# Patient Record
Sex: MALE | Race: WHITE | Employment: FULL TIME | URBAN - METROPOLITAN AREA
[De-identification: names, ages, dates, MRNs, and addresses within clinical notes are randomized per-mention and may not be internally consistent; named-entity substitution may affect disease eponyms.]

---

## 2017-03-31 ENCOUNTER — TELEPHONE (OUTPATIENT)
Dept: FAMILY MEDICINE | Facility: CLINIC | Age: 22
End: 2017-03-31

## 2017-03-31 NOTE — TELEPHONE ENCOUNTER
"Keith asked me to call patient for a \"check up\" call. Patient has not seen keith since 2015 and wants to know if he would like to make an appointment with her to follow up and check in. If patient calls back just give him the option.     "

## 2017-04-10 NOTE — TELEPHONE ENCOUNTER
A phone visit would be good.  Then we could catch up and update things.     Thanks.  GOMEZ Aguilar

## 2017-04-10 NOTE — TELEPHONE ENCOUNTER
I did talk to the patient and he states he is doing great.  Patient is in a half way house at this time in [place].  Patient reports he works everyday and it would be really hard to schedule an office visit.  Patient states he would like to talk to you.  I did put him in your schedule for 4/11/17 at 2pm.  For a phone visit.  Would you like to keep this appt for him?    Thank you  Allyson MOORE RN

## 2017-04-11 ENCOUNTER — VIRTUAL VISIT (OUTPATIENT)
Dept: FAMILY MEDICINE | Facility: CLINIC | Age: 22
End: 2017-04-11

## 2017-04-11 DIAGNOSIS — Z53.9 ERRONEOUS ENCOUNTER--DISREGARD: Primary | ICD-10-CM

## 2017-04-11 NOTE — MR AVS SNAPSHOT
"              After Visit Summary   2017    Obed De La Cruz    MRN: [id number]           Patient Information     Date Of Birth          1995        Visit Information        Provider Department      2017 2:00 PM Maddi Willett NP [facility]        Today's Diagnoses     ERRONEOUS ENCOUNTER--DISREGARD    -  1       Follow-ups after your visit        Who to contact     If you have questions or need follow up information about today's clinic visit or your schedule please contact [facility] directly at [phone].  Normal or non-critical lab and imaging results will be communicated to you by Feedtracehart, letter or phone within 4 business days after the clinic has received the results. If you do not hear from us within 7 days, please contact the clinic through Feedtracehart or phone. If you have a critical or abnormal lab result, we will notify you by phone as soon as possible.  Submit refill requests through AntFarm or call your pharmacy and they will forward the refill request to us. Please allow 3 business days for your refill to be completed.          Additional Information About Your Visit        MyChart Information     AntFarm lets you send messages to your doctor, view your test results, renew your prescriptions, schedule appointments and more. To sign up, go to [email or web address] . Click on \"Log in\" on the left side of the screen, which will take you to the Welcome page. Then click on \"Sign up Now\" on the right side of the page.     You will be asked to enter the access code listed below, as well as some personal information. Please follow the directions to create your username and password.     Your access code is: [email or web address]  Expires: 2017 12:45 PM     Your access code will  in 90 days. If you need help or a new code, please call your [facility] or [phone].        Care EveryWhere ID     This is your Care EveryWhere ID. This could be used by " other organizations to access your [facility] medical records  [id number]         Blood Pressure from Last 3 Encounters:   01/27/16 120/70   12/09/15 126/75   06/24/15 138/81    Weight from Last 3 Encounters:   01/27/16 172 lb (78 kg)   12/09/15 177 lb 6.4 oz (80.5 kg)   04/01/15 174 lb 6.4 oz (79.1 kg)              Today, you had the following     No orders found for display       Primary Care Provider Office Phone # Fax #    Maddi Fay Willett [phone]       [facility] [address] [address]        Thank you!     Thank you for choosing [facility]  for your care. Our goal is always to provide you with excellent care. Hearing back from our patients is one way we can continue to improve our services. Please take a few minutes to complete the written survey that you may receive in the mail after your visit with us. Thank you!             Your Updated Medication List - Protect others around you: Learn how to safely use, store and throw away your medicines at www.disposemymeds.org.          This list is accurate as of: 4/11/17 11:59 PM.  Always use your most recent med list.                   Brand Name Dispense Instructions for use    ascorbic acid 500 MG tablet    VITAMIN C     Take 500 mg by mouth daily       lisdexamfetamine 40 MG capsule    VYVANSE    30 capsule    Take 1 capsule (40 mg) by mouth every morning       multivitamin, therapeutic Tabs tablet      Take 1 tablet by mouth daily       VITAMIN D3 PO      Take 2,000 Units by mouth daily

## 2017-04-11 NOTE — PROGRESS NOTES
Called patient and he did not have time to do a phone visit. He will call back when he has more time.   Jessica Devries CMA ([facility])

## 2017-10-10 ENCOUNTER — OFFICE VISIT (OUTPATIENT)
Dept: FAMILY MEDICINE | Facility: CLINIC | Age: 22
End: 2017-10-10
Payer: COMMERCIAL

## 2017-10-10 VITALS
WEIGHT: 165 LBS | SYSTOLIC BLOOD PRESSURE: 126 MMHG | BODY MASS INDEX: 22.35 KG/M2 | TEMPERATURE: 99.2 F | DIASTOLIC BLOOD PRESSURE: 70 MMHG | OXYGEN SATURATION: 96 % | HEIGHT: 72 IN | HEART RATE: 80 BPM

## 2017-10-10 DIAGNOSIS — F11.20: ICD-10-CM

## 2017-10-10 DIAGNOSIS — J20.9 ACUTE BRONCHITIS WITH SYMPTOMS > 10 DAYS: ICD-10-CM

## 2017-10-10 DIAGNOSIS — R61 GENERALIZED HYPERHIDROSIS: Primary | ICD-10-CM

## 2017-10-10 DIAGNOSIS — F11.10 OPIATE ABUSE, EPISODIC (H): ICD-10-CM

## 2017-10-10 PROCEDURE — 99214 OFFICE O/P EST MOD 30 MIN: CPT | Performed by: NURSE PRACTITIONER

## 2017-10-10 RX ORDER — AZITHROMYCIN 250 MG/1
TABLET, FILM COATED ORAL
Qty: 6 TABLET | Refills: 0 | Status: SHIPPED | OUTPATIENT
Start: 2017-10-10 | End: 2018-06-07

## 2017-10-10 NOTE — PROGRESS NOTES
SUBJECTIVE:   Obed De La Cruz is a 22 year old male who presents to clinic today for the following health issues:    ENT Symptoms             Symptoms: cc Present Absent Comment   Fever/Chills  x     Fatigue  x     Muscle Aches   x    Eye Irritation   x    Sneezing   x    Nasal Dev/Drg  x     Sinus Pressure/Pain   x    Loss of smell   x    Dental pain   x    Sore Throat   x    Swollen Glands   x    Ear Pain/Fullness  x     Cough  x  Dry cough.    Wheeze  x     Chest Pain   x    Shortness of breath  x     Rash   x    Other   x      Symptom duration:  2 weeks    Symptom severity:  moderate    Treatments tried:  None    Contacts:  None      Reports some chills and sweating.  Persistent cough - non productive.    Also reports ongoing axillary sweating - excessive.  Has tried multiple over the counter options without much improvement.    Problem list and histories reviewed & adjusted, as indicated.  Additional history: as documented    Patient Active Problem List   Diagnosis     Attention deficit hyperactivity disorder (ADHD)     Anxiety     Substance abuse     Heroin use disorder, severe, in controlled environment, dependence (H)     Opiate abuse, episodic     History reviewed. No pertinent surgical history.    Social History   Substance Use Topics     Smoking status: Former Smoker     Smokeless tobacco: Former User     Alcohol use Yes     Family History   Problem Relation Age of Onset     Hypertension Father          Current Outpatient Prescriptions   Medication Sig Dispense Refill     aluminum chloride (DRYSOL) 20 % external solution Apply topically At Bedtime To improve effect, cover area of application with plastic wrap,  hold in place with tight shirt, and wash area in morning. As sweating improves, decrease use to 1-2 times weekly. 60 mL 3     azithromycin (ZITHROMAX) 250 MG tablet Two tablets first day, then one tablet daily for four days. 6 tablet 0     multivitamin, therapeutic (THERA-VIT) TABS Take 1 tablet  by mouth daily       ascorbic acid (VITAMIN C) 500 MG tablet Take 500 mg by mouth daily       Cholecalciferol (VITAMIN D3 PO) Take 2,000 Units by mouth daily       lisdexamfetamine (VYVANSE) 40 MG capsule Take 1 capsule (40 mg) by mouth every morning 30 capsule 0     Allergies   Allergen Reactions     Sulfa Drugs Rash     Recent Labs   Lab Test  08/02/12   2150  04/09/12   1930   CR  1.16*  0.90   GFRESTIMATED  81  >90   GFRESTBLACK  >90  >90   POTASSIUM  3.7  4.1   TSH   --   0.86      BP Readings from Last 3 Encounters:   10/10/17 126/70   01/27/16 120/70   12/09/15 126/75    Wt Readings from Last 3 Encounters:   10/10/17 165 lb (74.8 kg)   01/27/16 172 lb (78 kg)   12/09/15 177 lb 6.4 oz (80.5 kg)                  Labs reviewed in EPIC          Reviewed and updated as needed this visit by clinical staff     Reviewed and updated as needed this visit by Provider         ROS:  Constitutional, HEENT, cardiovascular, pulmonary, GI, , musculoskeletal, neuro, skin, endocrine and psych systems are negative, except as otherwise noted.      OBJECTIVE:   /70  Pulse 80  Temp 99.2  F (37.3  C) (Tympanic)  Ht 6' (1.829 m)  Wt 165 lb (74.8 kg)  SpO2 96%  BMI 22.38 kg/m2  Body mass index is 22.38 kg/(m^2).  GENERAL: healthy, alert and no distress  HENT: ear canals and TM's normal, nose and mouth without ulcers or lesions  NECK: no adenopathy, no asymmetry, masses, or scars and thyroid normal to palpation  RESP: lungs clear to auscultation - no rales, rhonchi or wheezes  CV: regular rate and rhythm, normal S1 S2, no S3 or S4, no murmur, click or rub, no peripheral edema and peripheral pulses strong  ABDOMEN: soft, nontender, no hepatosplenomegaly, no masses and bowel sounds normal  MS: no gross musculoskeletal defects noted, no edema  Skin: no lesions or rashes.    Diagnostic Test Results:  none     ASSESSMENT/PLAN:     1. Generalized hyperhidrosis   The risks, benefits and treatment options of prescribed  medications or other treatments have been discussed with the patient. The patient verbalized their understanding and should call or follow up if no improvement or if they develop further problems.        - aluminum chloride (DRYSOL) 20 % external solution; Apply topically At Bedtime To improve effect, cover area of application with plastic wrap,  hold in place with tight shirt, and wash area in morning. As sweating improves, decrease use to 1-2 times weekly.  Dispense: 60 mL; Refill: 3    2. Acute bronchitis with symptoms > 10 days     - azithromycin (ZITHROMAX) 250 MG tablet; Two tablets first day, then one tablet daily for four days.  Dispense: 6 tablet; Refill: 0    3. Heroin use disorder, severe, in controlled environment, dependence (H)   Currently sober X 9 months.  Enrolled in school.  Attending AA 2 X per week.  Doing very well.    4. Opiate abuse, episodic   Sober.  Discussed maintaining sobriety  Discussed support groups and resources.      There are no Patient Instructions on file for this visit.    Maddi Willett NP  [facility]    Total times spent with patient 30 minutes of which > 50% of the time was spent counseling and coordination of care discussion of above, sobriety, plan and follow up

## 2017-10-10 NOTE — MR AVS SNAPSHOT
"              After Visit Summary   10/10/2017    Obed De La Cruz    MRN: [id number]           Patient Information     Date Of Birth          1995        Visit Information        Provider Department      10/10/2017 12:40 PM Maddi Willett NP [facility]        Today's Diagnoses     Generalized hyperhidrosis    -  1    Acute bronchitis with symptoms > 10 days        Heroin use disorder, severe, in controlled environment, dependence (H)        Opiate abuse, episodic           Follow-ups after your visit        Who to contact     If you have questions or need follow up information about today's clinic visit or your schedule please contact [facility] directly at [phone].  Normal or non-critical lab and imaging results will be communicated to you by Stream Tagshart, letter or phone within 4 business days after the clinic has received the results. If you do not hear from us within 7 days, please contact the clinic through Stream Tagshart or phone. If you have a critical or abnormal lab result, we will notify you by phone as soon as possible.  Submit refill requests through TechLoaner or call your pharmacy and they will forward the refill request to us. Please allow 3 business days for your refill to be completed.          Additional Information About Your Visit        MyChart Information     TechLoaner lets you send messages to your doctor, view your test results, renew your prescriptions, schedule appointments and more. To sign up, go to [email or web address] . Click on \"Log in\" on the left side of the screen, which will take you to the Welcome page. Then click on \"Sign up Now\" on the right side of the page.     You will be asked to enter the access code listed below, as well as some personal information. Please follow the directions to create your username and password.     Your access code is: [email or web address]  Expires: 2018  3:17 PM     Your access code will  in 90 days. If you need help " or a new code, please call your [facility] clinic or [phone].        Care EveryWhere ID     This is your Care EveryWhere ID. This could be used by other organizations to access your [facility] medical records  [id number]        Your Vitals Were     Pulse Temperature Height Pulse Oximetry BMI (Body Mass Index)       80 99.2  F (37.3  C) (Tympanic) 6' (1.829 m) 96% 22.38 kg/m2        Blood Pressure from Last 3 Encounters:   10/10/17 126/70   01/27/16 120/70   12/09/15 126/75    Weight from Last 3 Encounters:   10/10/17 165 lb (74.8 kg)   01/27/16 172 lb (78 kg)   12/09/15 177 lb 6.4 oz (80.5 kg)              Today, you had the following     No orders found for display         Today's Medication Changes          These changes are accurate as of: 10/10/17  3:17 PM.  If you have any questions, ask your nurse or doctor.               Start taking these medicines.        Dose/Directions    aluminum chloride 20 % external solution   Commonly known as:  DRYSOL   Used for:  Generalized hyperhidrosis   Started by:  Maddi Willett, NP        Apply topically At Bedtime To improve effect, cover area of application with plastic wrap,  hold in place with tight shirt, and wash area in morning. As sweating improves, decrease use to 1-2 times weekly.   Quantity:  60 mL   Refills:  3       azithromycin 250 MG tablet   Commonly known as:  ZITHROMAX   Used for:  Acute bronchitis with symptoms > 10 days   Started by:  Maddi Willett NP        Two tablets first day, then one tablet daily for four days.   Quantity:  6 tablet   Refills:  0            Where to get your medicines      These medications were sent to [facility] [place], [place] - [address]  [address] [address]     Phone:  [phone]     azithromycin 250 MG tablet         Some of these will need a paper prescription and others can be bought over the counter.  Ask your nurse if you have questions.     Bring a paper  prescription for each of these medications     aluminum chloride 20 % external solution                Primary Care Provider Office Phone # Fax #    Maddi Willett, [place] [phone] [address] [address]        Equal Access to Services     GÓMEZ COFFEY : Hadii aad ku hadchriso Soomaali, waaxda luqadaha, qaybta kaalmada adeegyada, ernesto gracia haycesarn marie bethaniekellen araujo. So [facility] [phone].    ATENCIÓN: Si habla español, tiene a espino disposición servicios gratuitos de asistencia lingüística. Llame al [phone].    We comply with applicable federal civil rights laws and [place] laws. We do not discriminate on the basis of race, color, national origin, age, disability, sex, sexual orientation, or gender identity.            Thank you!     Thank you for choosing [facility]  for your care. Our goal is always to provide you with excellent care. Hearing back from our patients is one way we can continue to improve our services. Please take a few minutes to complete the written survey that you may receive in the mail after your visit with us. Thank you!             Your Updated Medication List - Protect others around you: Learn how to safely use, store and throw away your medicines at www.disposemymeds.org.          This list is accurate as of: 10/10/17  3:17 PM.  Always use your most recent med list.                   Brand Name Dispense Instructions for use Diagnosis    aluminum chloride 20 % external solution    DRYSOL    60 mL    Apply topically At Bedtime To improve effect, cover area of application with plastic wrap,  hold in place with tight shirt, and wash area in morning. As sweating improves, decrease use to 1-2 times weekly.    Generalized hyperhidrosis       ascorbic acid 500 MG tablet    VITAMIN C     Take 500 mg by mouth daily        azithromycin 250 MG tablet    ZITHROMAX    6 tablet    Two tablets first day, then one tablet daily for four days.     Acute bronchitis with symptoms > 10 days       lisdexamfetamine 40 MG capsule    VYVANSE    30 capsule    Take 1 capsule (40 mg) by mouth every morning    Attention deficit disorder with hyperactivity(314.01)       multivitamin, therapeutic Tabs tablet      Take 1 tablet by mouth daily        VITAMIN D3 PO      Take 2,000 Units by mouth daily

## 2017-10-10 NOTE — NURSING NOTE
Initial /70  Pulse 80  Temp 99.2  F (37.3  C) (Tympanic)  Ht 6' (1.829 m)  Wt 165 lb (74.8 kg)  SpO2 96%  BMI 22.38 kg/m2 Estimated body mass index is 22.38 kg/(m^2) as calculated from the following:    Height as of this encounter: 6' (1.829 m).    Weight as of this encounter: 165 lb (74.8 kg). .    Jessica Devries CMA ([facility])

## 2018-06-07 ENCOUNTER — OFFICE VISIT (OUTPATIENT)
Dept: FAMILY MEDICINE | Facility: CLINIC | Age: 23
End: 2018-06-07
Payer: COMMERCIAL

## 2018-06-07 VITALS
SYSTOLIC BLOOD PRESSURE: 136 MMHG | WEIGHT: 168 LBS | HEIGHT: 72 IN | DIASTOLIC BLOOD PRESSURE: 72 MMHG | BODY MASS INDEX: 22.75 KG/M2 | TEMPERATURE: 97.7 F | HEART RATE: 61 BPM

## 2018-06-07 DIAGNOSIS — F11.20: ICD-10-CM
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
## 2018-06-07 DIAGNOSIS — S40.861A TICK BITE OF RIGHT UPPER ARM, INITIAL ENCOUNTER: Primary | ICD-10-CM

## 2018-06-07 PROCEDURE — 99213 OFFICE O/P EST LOW 20 MIN: CPT | Performed by: NURSE PRACTITIONER

## 2018-06-07 RX ORDER — DOXYCYCLINE HYCLATE 100 MG
200 TABLET ORAL ONCE
Qty: 2 TABLET | Refills: 0 | Status: SHIPPED | OUTPATIENT
Start: 2018-06-07 | End: 2019-01-31

## 2018-06-07 NOTE — PROGRESS NOTES
SUBJECTIVE:   Obed De La Cruz is a 23 year old male who presents to clinic today for the following health issues:    Tick Bite.         Description (location/character/radiation): pt is concerned about a tick he found on his right side. He was at a park last Saturday and found the tick on Monday of this week. Pt states the tick was deep in his skin and it was difficult to get out. Pt denies and fevers or body aches.      No joint pain, rashes, fevers.    History of heroin abuse, substance abuse disorder.  Has been sober for 18 months now.  In college, on summer break living with parent.  Staying involved in programs and treatment.    Problem list and histories reviewed & adjusted, as indicated.  Additional history: as documented    Patient Active Problem List   Diagnosis     Attention deficit hyperactivity disorder (ADHD)     Anxiety     Substance abuse     Heroin use disorder, severe, in controlled environment, dependence (H)     Opiate abuse, episodic     History reviewed. No pertinent surgical history.    Social History   Substance Use Topics     Smoking status: Former Smoker     Smokeless tobacco: Former User     Alcohol use Yes     Family History   Problem Relation Age of Onset     Hypertension Father          Current Outpatient Prescriptions   Medication Sig Dispense Refill     aluminum chloride (DRYSOL) 20 % external solution Apply topically At Bedtime To improve effect, cover area of application with plastic wrap,  hold in place with tight shirt, and wash area in morning. As sweating improves, decrease use to 1-2 times weekly. (Patient not taking: Reported on 6/7/2018) 60 mL 3     ascorbic acid (VITAMIN C) 500 MG tablet Take 500 mg by mouth daily       Cholecalciferol (VITAMIN D3 PO) Take 2,000 Units by mouth daily       doxycycline (VIBRA-TABS) 100 MG tablet Take 2 tablets (200 mg) by mouth once for 1 dose 2 tablet 0     lisdexamfetamine (VYVANSE) 40 MG capsule Take 1 capsule (40 mg) by mouth every morning  (Patient not taking: Reported on 6/7/2018) 30 capsule 0     multivitamin, therapeutic (THERA-VIT) TABS Take 1 tablet by mouth daily       Allergies   Allergen Reactions     Sulfa Drugs Rash     Recent Labs   Lab Test  08/02/12   2150  04/09/12   1930   CR  1.16*  0.90   GFRESTIMATED  81  >90   GFRESTBLACK  >90  >90   POTASSIUM  3.7  4.1   TSH   --   0.86      BP Readings from Last 3 Encounters:   06/07/18 136/72   10/10/17 126/70   01/27/16 120/70    Wt Readings from Last 3 Encounters:   06/07/18 168 lb (76.2 kg)   10/10/17 165 lb (74.8 kg)   01/27/16 172 lb (78 kg)                  Labs reviewed in EPIC    Reviewed and updated as needed this visit by clinical staff       Reviewed and updated as needed this visit by Provider         ROS:  Constitutional, HEENT, cardiovascular, pulmonary, GI, , musculoskeletal, neuro, skin, endocrine and psych systems are negative, except as otherwise noted.    OBJECTIVE:     /72  Pulse 61  Temp 97.7  F (36.5  C) (Tympanic)  Ht 6' (1.829 m)  Wt 168 lb (76.2 kg)  BMI 22.78 kg/m2  Body mass index is 22.78 kg/(m^2).  GENERAL: healthy, alert and no distress  SKIN: no suspicious lesions or rashes and tick bite site in right upper arm, with small < 1 mm area of mild erythema otherwise healing.  PSYCH: mentation appears normal, affect normal/bright    Diagnostic Test Results:  none     ASSESSMENT/PLAN:     1. Tick bite of right upper arm, initial encounter  Discussed risks of transmission - thought was on > or = 72 hours.  The risks, benefits and treatment options of prescribed medications or other treatments have been discussed with the patient. The patient verbalized their understanding and should call or follow up if no improvement or if they develop further problems.    - doxycycline (VIBRA-TABS) 100 MG tablet; Take 2 tablets (200 mg) by mouth once for 1 dose  Dispense: 2 tablet; Refill: 0    2. Heroin use disorder, severe, in controlled environment, dependence (H)  Sober  for 18 months.  Encouraged continued sobriety.        Patient Instructions     Reassured regarding the likelyhood of transmission of Lyme disease in this setting.   Transmission of Lyme disease is unlikely if the tick is not clearly a larval form or a clearly defined deer tick, if the tick was not engorged or implanted less than 24 hours.  In studies where the tick was on for less than 24 hours the transmission was 0%.   If the tick was likely on more than 24 hrs, and it appears to be a deer tick, in areas of high prevalence of the Lyme bacteria, a single 200mg dose of doxycycline may be indicated to reduce the risk of Lyme disease, if given within 72 hrs of the tick being taken off.       GOMEZ Aguilar NP  [facility]

## 2018-06-07 NOTE — MR AVS SNAPSHOT
After Visit Summary   6/7/2018    Obed De La Cruz    MRN: [id number]           Patient Information     Date Of Birth          1995        Visit Information        Provider Department      6/7/2018 8:00 AM Maddi Willett NP [facility]        Today's Diagnoses     Tick bite of right upper arm, initial encounter    -  1      Care Instructions      Reassured regarding the likelyhood of transmission of Lyme disease in this setting.   Transmission of Lyme disease is unlikely if the tick is not clearly a larval form or a clearly defined deer tick, if the tick was not engorged or implanted less than 24 hours.  In studies where the tick was on for less than 24 hours the transmission was 0%.   If the tick was likely on more than 24 hrs, and it appears to be a deer tick, in areas of high prevalence of the Lyme bacteria, a single 200mg dose of doxycycline may be indicated to reduce the risk of Lyme disease, if given within 72 hrs of the tick being taken off.       GOMEZ Aguilar            Follow-ups after your visit        Who to contact     If you have questions or need follow up information about today's clinic visit or your schedule please contact [facility] directly at [phone].  Normal or non-critical lab and imaging results will be communicated to you by MyChart, letter or phone within 4 business days after the clinic has received the results. If you do not hear from us within 7 days, please contact the clinic through MyChart or phone. If you have a critical or abnormal lab result, we will notify you by phone as soon as possible.  Submit refill requests through [employer] or call your pharmacy and they will forward the refill request to us. Please allow 3 business days for your refill to be completed.          Additional Information About Your Visit        Care EveryWhere ID     This is your Care EveryWhere ID. This could be used by other organizations to access  your [facility] medical records  [id number]        Your Vitals Were     Pulse Temperature Height BMI (Body Mass Index)          61 97.7  F (36.5  C) (Tympanic) 6' (1.829 m) 22.78 kg/m2         Blood Pressure from Last 3 Encounters:   06/07/18 136/72   10/10/17 126/70   01/27/16 120/70    Weight from Last 3 Encounters:   06/07/18 168 lb (76.2 kg)   10/10/17 165 lb (74.8 kg)   01/27/16 172 lb (78 kg)              Today, you had the following     No orders found for display         Today's Medication Changes          These changes are accurate as of 6/7/18  8:44 AM.  If you have any questions, ask your nurse or doctor.               Start taking these medicines.        Dose/Directions    doxycycline 100 MG tablet   Commonly known as:  VIBRA-TABS   Used for:  Tick bite of right upper arm, initial encounter   Started by:  Maddi Willett NP        Dose:  200 mg   Take 2 tablets (200 mg) by mouth once for 1 dose   Quantity:  2 tablet   Refills:  0            Where to get your medicines      These medications were sent to [facility] [address]  [address] [facility] [address] [address]     Phone:  [phone]     doxycycline 100 MG tablet                Primary Care Provider Office Phone # Fax #    Maddi Willett [phone]       [address] [facility] [address]        Equal Access to Services     GÓMEZ COFFEY AH: Hadii janeth leeo Sosadi, waaxda luqadaha, qaybta kaalmada adeegyada, waxnelsy gracia maria adefrancesco araujo. So [facility] [phone].    ATENCIÓN: Si habla español, tiene a espino disposición servicios gratuitos de asistencia lingüística. Mikael alejo [phone].    We comply with applicable federal civil rights laws and [place] laws. We do not discriminate on the basis of race, color, national origin, age, disability, sex, sexual orientation, or gender identity.            Thank you!     Thank you for choosing [facility]  for  your care. Our goal is always to provide you with excellent care. Hearing back from our patients is one way we can continue to improve our services. Please take a few minutes to complete the written survey that you may receive in the mail after your visit with us. Thank you!             Your Updated Medication List - Protect others around you: Learn how to safely use, store and throw away your medicines at www.disposemymeds.org.          This list is accurate as of 6/7/18  8:44 AM.  Always use your most recent med list.                   Brand Name Dispense Instructions for use Diagnosis    aluminum chloride 20 % external solution    DRYSOL    60 mL    Apply topically At Bedtime To improve effect, cover area of application with plastic wrap,  hold in place with tight shirt, and wash area in morning. As sweating improves, decrease use to 1-2 times weekly.    Generalized hyperhidrosis       ascorbic acid 500 MG tablet    VITAMIN C     Take 500 mg by mouth daily        doxycycline 100 MG tablet    VIBRA-TABS    2 tablet    Take 2 tablets (200 mg) by mouth once for 1 dose    Tick bite of right upper arm, initial encounter       lisdexamfetamine 40 MG capsule    VYVANSE    30 capsule    Take 1 capsule (40 mg) by mouth every morning    Attention deficit disorder with hyperactivity(314.01)       multivitamin, therapeutic Tabs tablet      Take 1 tablet by mouth daily        VITAMIN D3 PO      Take 2,000 Units by mouth daily

## 2018-06-07 NOTE — NURSING NOTE
Initial /72  Pulse 61  Temp 97.7  F (36.5  C) (Tympanic)  Ht 6' (1.829 m)  Wt 168 lb (76.2 kg)  BMI 22.78 kg/m2 Estimated body mass index is 22.78 kg/(m^2) as calculated from the following:    Height as of this encounter: 6' (1.829 m).    Weight as of this encounter: 168 lb (76.2 kg). .    Jessica Devries CMA ([facility])

## 2018-06-07 NOTE — PATIENT INSTRUCTIONS
Reassured regarding the likelyhood of transmission of Lyme disease in this setting.   Transmission of Lyme disease is unlikely if the tick is not clearly a larval form or a clearly defined deer tick, if the tick was not engorged or implanted less than 24 hours.  In studies where the tick was on for less than 24 hours the transmission was 0%.   If the tick was likely on more than 24 hrs, and it appears to be a deer tick, in areas of high prevalence of the Lyme bacteria, a single 200mg dose of doxycycline may be indicated to reduce the risk of Lyme disease, if given within 72 hrs of the tick being taken off.       GOMEZ Aguilar

## 2019-01-31 ENCOUNTER — OFFICE VISIT (OUTPATIENT)
Dept: FAMILY MEDICINE | Facility: CLINIC | Age: 24
End: 2019-01-31
Payer: COMMERCIAL

## 2019-01-31 VITALS
DIASTOLIC BLOOD PRESSURE: 74 MMHG | BODY MASS INDEX: 23.24 KG/M2 | TEMPERATURE: 97.7 F | HEIGHT: 71 IN | RESPIRATION RATE: 14 BRPM | WEIGHT: 166 LBS | SYSTOLIC BLOOD PRESSURE: 128 MMHG | OXYGEN SATURATION: 98 % | HEART RATE: 78 BPM

## 2019-01-31 DIAGNOSIS — E55.9 HYPOVITAMINOSIS D: ICD-10-CM

## 2019-01-31 DIAGNOSIS — F90.9 ATTENTION DEFICIT HYPERACTIVITY DISORDER (ADHD), UNSPECIFIED ADHD TYPE: ICD-10-CM

## 2019-01-31 DIAGNOSIS — M25.50 MULTIPLE JOINT PAIN: ICD-10-CM

## 2019-01-31 DIAGNOSIS — F41.1 GAD (GENERALIZED ANXIETY DISORDER): Primary | ICD-10-CM

## 2019-01-31 DIAGNOSIS — R53.83 FATIGUE, UNSPECIFIED TYPE: ICD-10-CM

## 2019-01-31 LAB
ALBUMIN SERPL-MCNC: 4.4 G/DL (ref 3.4–5)
ALP SERPL-CCNC: 65 U/L (ref 40–150)
ALT SERPL W P-5'-P-CCNC: 44 U/L (ref 0–70)
ANION GAP SERPL CALCULATED.3IONS-SCNC: 1 MMOL/L (ref 3–14)
AST SERPL W P-5'-P-CCNC: 20 U/L (ref 0–45)
B BURGDOR IGG+IGM SER QL: 0.05 (ref 0–0.89)
BILIRUB SERPL-MCNC: 0.6 MG/DL (ref 0.2–1.3)
BUN SERPL-MCNC: 18 MG/DL (ref 7–30)
CALCIUM SERPL-MCNC: 9.3 MG/DL (ref 8.5–10.1)
CHLORIDE SERPL-SCNC: 103 MMOL/L (ref 94–109)
CO2 SERPL-SCNC: 32 MMOL/L (ref 20–32)
CREAT SERPL-MCNC: 0.99 MG/DL (ref 0.66–1.25)
CRP SERPL-MCNC: <2.9 MG/L (ref 0–8)
DEPRECATED CALCIDIOL+CALCIFEROL SERPL-MC: 19 UG/L (ref 20–75)
ERYTHROCYTE [DISTWIDTH] IN BLOOD BY AUTOMATED COUNT: 12.1 % (ref 10–15)
ERYTHROCYTE [SEDIMENTATION RATE] IN BLOOD BY WESTERGREN METHOD: 6 MM/H (ref 0–15)
GFR SERPL CREATININE-BSD FRML MDRD: >90 ML/MIN/{1.73_M2}
GLUCOSE SERPL-MCNC: 97 MG/DL (ref 70–99)
HCT VFR BLD AUTO: 43.4 % (ref 40–53)
HGB BLD-MCNC: 15.2 G/DL (ref 13.3–17.7)
MCH RBC QN AUTO: 29.2 PG (ref 26.5–33)
MCHC RBC AUTO-ENTMCNC: 35 G/DL (ref 31.5–36.5)
MCV RBC AUTO: 83 FL (ref 78–100)
PLATELET # BLD AUTO: 222 10E9/L (ref 150–450)
POTASSIUM SERPL-SCNC: 4 MMOL/L (ref 3.4–5.3)
PROT SERPL-MCNC: 8.1 G/DL (ref 6.8–8.8)
RBC # BLD AUTO: 5.21 10E12/L (ref 4.4–5.9)
SODIUM SERPL-SCNC: 136 MMOL/L (ref 133–144)
TSH SERPL DL<=0.005 MIU/L-ACNC: 0.53 MU/L (ref 0.4–4)
WBC # BLD AUTO: 6.6 10E9/L (ref 4–11)

## 2019-01-31 PROCEDURE — 85652 RBC SED RATE AUTOMATED: CPT | Performed by: INTERNAL MEDICINE

## 2019-01-31 PROCEDURE — 82306 VITAMIN D 25 HYDROXY: CPT | Performed by: INTERNAL MEDICINE

## 2019-01-31 PROCEDURE — 86618 LYME DISEASE ANTIBODY: CPT | Performed by: INTERNAL MEDICINE

## 2019-01-31 PROCEDURE — 85027 COMPLETE CBC AUTOMATED: CPT | Performed by: INTERNAL MEDICINE

## 2019-01-31 PROCEDURE — 84443 ASSAY THYROID STIM HORMONE: CPT | Performed by: INTERNAL MEDICINE

## 2019-01-31 PROCEDURE — 80053 COMPREHEN METABOLIC PANEL: CPT | Performed by: INTERNAL MEDICINE

## 2019-01-31 PROCEDURE — 36415 COLL VENOUS BLD VENIPUNCTURE: CPT | Performed by: INTERNAL MEDICINE

## 2019-01-31 PROCEDURE — 99214 OFFICE O/P EST MOD 30 MIN: CPT | Performed by: INTERNAL MEDICINE

## 2019-01-31 PROCEDURE — 86140 C-REACTIVE PROTEIN: CPT | Performed by: INTERNAL MEDICINE

## 2019-01-31 RX ORDER — BUPROPION HYDROCHLORIDE 150 MG/1
150 TABLET ORAL EVERY MORNING
Qty: 90 TABLET | Refills: 0 | Status: SHIPPED | OUTPATIENT
Start: 2019-01-31 | End: 2021-04-16

## 2019-01-31 SDOH — HEALTH STABILITY: MENTAL HEALTH: HOW OFTEN DO YOU HAVE A DRINK CONTAINING ALCOHOL?: NEVER

## 2019-01-31 ASSESSMENT — MIFFLIN-ST. JEOR: SCORE: 1752.16

## 2019-01-31 ASSESSMENT — PATIENT HEALTH QUESTIONNAIRE - PHQ9
5. POOR APPETITE OR OVEREATING: NOT AT ALL
SUM OF ALL RESPONSES TO PHQ QUESTIONS 1-9: 9

## 2019-01-31 ASSESSMENT — ANXIETY QUESTIONNAIRES
GAD7 TOTAL SCORE: 4
7. FEELING AFRAID AS IF SOMETHING AWFUL MIGHT HAPPEN: NOT AT ALL
2. NOT BEING ABLE TO STOP OR CONTROL WORRYING: SEVERAL DAYS
1. FEELING NERVOUS, ANXIOUS, OR ON EDGE: MORE THAN HALF THE DAYS
3. WORRYING TOO MUCH ABOUT DIFFERENT THINGS: NOT AT ALL
5. BEING SO RESTLESS THAT IT IS HARD TO SIT STILL: SEVERAL DAYS
6. BECOMING EASILY ANNOYED OR IRRITABLE: NOT AT ALL
IF YOU CHECKED OFF ANY PROBLEMS ON THIS QUESTIONNAIRE, HOW DIFFICULT HAVE THESE PROBLEMS MADE IT FOR YOU TO DO YOUR WORK, TAKE CARE OF THINGS AT HOME, OR GET ALONG WITH OTHER PEOPLE: SOMEWHAT DIFFICULT

## 2019-01-31 NOTE — LETTER
February 1, 2019      Obed De La Cruz  [address] [facility] [address]        Dear ,    We are writing to inform you of your test results.    TSH and CMP is normal.  CRP and sed rate is normal.  Your Vitamin D is low, which may be contributing to fatigue.  I would recommend taking a high dose 50,000 unit Vitamin D pill once per week for 8 weeks, and then having the level rechecked.  Prescription sent and lab order is in. Thanks.     Resulted Orders   Comprehensive metabolic panel   Result Value Ref Range    Sodium 136 133 - 144 mmol/L    Potassium 4.0 3.4 - 5.3 mmol/L    Chloride 103 94 - 109 mmol/L    Carbon Dioxide 32 20 - 32 mmol/L    Anion Gap 1 (L) 3 - 14 mmol/L    Glucose 97 70 - 99 mg/dL    Urea Nitrogen 18 7 - 30 mg/dL    Creatinine 0.99 0.66 - 1.25 mg/dL    GFR Estimate >90 >60 mL/min/[1.73_m2]      Comment:      Non  GFR Calc  Starting 12/18/2018, serum creatinine based estimated GFR (eGFR) will be   calculated using the Chronic Kidney Disease Epidemiology Collaboration   (CKD-EPI) equation.      GFR Estimate If Black >90 >60 mL/min/[1.73_m2]      Comment:       GFR Calc  Starting 12/18/2018, serum creatinine based estimated GFR (eGFR) will be   calculated using the Chronic Kidney Disease Epidemiology Collaboration   (CKD-EPI) equation.      Calcium 9.3 8.5 - 10.1 mg/dL    Bilirubin Total 0.6 0.2 - 1.3 mg/dL    Albumin 4.4 3.4 - 5.0 g/dL    Protein Total 8.1 6.8 - 8.8 g/dL    Alkaline Phosphatase 65 40 - 150 U/L    ALT 44 0 - 70 U/L    AST 20 0 - 45 U/L   CBC with platelets   Result Value Ref Range    WBC 6.6 4.0 - 11.0 10e9/L    RBC Count 5.21 4.4 - 5.9 10e12/L    Hemoglobin 15.2 13.3 - 17.7 g/dL    Hematocrit 43.4 40.0 - 53.0 %    MCV 83 78 - 100 fl    MCH 29.2 26.5 - 33.0 pg    MCHC 35.0 31.5 - 36.5 g/dL    RDW 12.1 10.0 - 15.0 %    Platelet Count 222 150 - 450 10e9/L   TSH with free T4 reflex   Result Value Ref Range    TSH 0.53 0.40 - 4.00 mU/L    Vitamin D Deficiency   Result Value Ref Range    Vitamin D Deficiency screening 19 (L) 20 - 75 ug/L      Comment:      Season, race, dietary intake, and treatment affect the concentration of   25-hydroxy-Vitamin D. Values may decrease during winter months and increase   during summer months. Values 20-29 ug/L may indicate Vitamin D insufficiency   and values <20 ug/L may indicate Vitamin D deficiency.  Vitamin D determination is routinely performed by an immunoassay specific for   25 hydroxyvitamin D3.  If an individual is on vitamin D2 (ergocalciferol)   supplementation, please specify 25 OH vitamin D2 and D3 level determination by   LCMSMS test [id number].     Erythrocyte sedimentation rate auto   Result Value Ref Range    Sed Rate 6 0 - 15 mm/h   CRP inflammation   Result Value Ref Range    CRP Inflammation <2.9 0.0 - 8.0 mg/L       If you have any questions or concerns, please call the clinic at the number listed above.       Sincerely,        Alexi Mark MD

## 2019-01-31 NOTE — PROGRESS NOTES
SUBJECTIVE:   Obed De La Cruz is a 23 year old male who presents to clinic today for the following health issues:    Chief Complaint   Patient presents with     Anxiety     Blood Draw     Abnormal Mood Symptoms  Onset: 10 years    Description:   Depression: no   Anxiety: YES- worse recently due to school stress and playing basketball there.  It makes it harder for him to do all his daily activities, struggles to complete his homework and this is impacting his grades    Accompanying Signs & Symptoms:  Still participating in activities that you used to enjoy: YES  Fatigue: YES- sometimes   Irritability: YES- sometimes  Difficulty concentrating: YES  Changes in appetite: no   Problems with sleep: YES- trouble falling and staying asleep  Heart racing/beating fast : YES  Thoughts of hurting yourself or others: none    History:   Recent stress: no   Prior depression hospitalization: None  Family history of depression: no   Family history of anxiety: YES    Precipitating factors:   Alcohol/drug use: YES- history of opioid abuse    Alleviating factors:  Yoga, meditation and prayer all have helped in the past.     Therapies Tried and outcome: Was briefly on Zoloft in the past, doesn't recall if that helped, says it was only brief, no side effects.  He is going to be establishing with a therapist at school- he has an appointment next week.      PHQ-9 SCORE 3/10/2015 1/31/2019   PHQ-9 Total Score 1 -   PHQ-9 Total Score - 9     HIRA-7 SCORE 1/31/2019   Total Score 4         He's had some fatigue and diffuse joint aches and would like blood work done.  No joint swelling or redness.  Pain is especially in the knees.  No particular injuries but feels the basketball is tough on the knees.  No FHx of arthritis.  I do note that he was treated empirically with doxycycline prophylaxis for tick bite last year.       Problem list and histories reviewed & adjusted, as indicated.  Additional history: as documented    Patient Active  "Problem List   Diagnosis     Attention deficit hyperactivity disorder (ADHD)     Anxiety     Substance abuse (H)     Heroin use disorder, severe, in controlled environment, dependence (H)     Opiate abuse, episodic (H)     History reviewed. No pertinent surgical history.    Social History     Tobacco Use     Smoking status: Former Smoker     Smokeless tobacco: Former User   Substance Use Topics     Alcohol use: No     Frequency: Never     Family History   Problem Relation Age of Onset     Hypertension Father          Current Outpatient Medications   Medication Sig Dispense Refill     ascorbic acid (VITAMIN C) 500 MG tablet Take 500 mg by mouth daily       buPROPion (WELLBUTRIN XL) 150 MG 24 hr tablet Take 1 tablet (150 mg) by mouth every morning 90 tablet 0     Cholecalciferol (VITAMIN D3 PO) Take 2,000 Units by mouth daily       aluminum chloride (DRYSOL) 20 % external solution Apply topically At Bedtime To improve effect, cover area of application with plastic wrap,  hold in place with tight shirt, and wash area in morning. As sweating improves, decrease use to 1-2 times weekly. (Patient not taking: Reported on 6/7/2018) 60 mL 3     lisdexamfetamine (VYVANSE) 40 MG capsule Take 1 capsule (40 mg) by mouth every morning (Patient not taking: Reported on 6/7/2018) 30 capsule 0     multivitamin, therapeutic (THERA-VIT) TABS Take 1 tablet by mouth daily       Allergies   Allergen Reactions     Sulfa Drugs Rash       Reviewed and updated as needed this visit by clinical staff  Tobacco  Allergies  Med Hx  Surg Hx  Fam Hx  Soc Hx      Reviewed and updated as needed this visit by Provider         ROS:  Constitutional, MSK systems are negative, except as otherwise noted.    OBJECTIVE:     /74 (BP Location: Left arm, Patient Position: Sitting, Cuff Size: Adult Regular)   Pulse 78   Temp 97.7  F (36.5  C) (Tympanic)   Resp 14   Ht 1.791 m (5' 10.5\")   Wt 75.3 kg (166 lb)   SpO2 98%   BMI 23.48 kg/m    Body " mass index is 23.48 kg/m .  GENERAL: healthy, alert and no distress  MS: anterior knee pain to palpation bilaterally, no joint laxity, swelling, redness    Diagnostic Test Results:  Results for orders placed or performed in visit on 01/31/19 (from the past 24 hour(s))   Comprehensive metabolic panel   Result Value Ref Range    Sodium 136 133 - 144 mmol/L    Potassium 4.0 3.4 - 5.3 mmol/L    Chloride 103 94 - 109 mmol/L    Carbon Dioxide 32 20 - 32 mmol/L    Anion Gap 1 (L) 3 - 14 mmol/L    Glucose 97 70 - 99 mg/dL    Urea Nitrogen 18 7 - 30 mg/dL    Creatinine 0.99 0.66 - 1.25 mg/dL    GFR Estimate >90 >60 mL/min/[1.73_m2]    GFR Estimate If Black >90 >60 mL/min/[1.73_m2]    Calcium 9.3 8.5 - 10.1 mg/dL    Bilirubin Total 0.6 0.2 - 1.3 mg/dL    Albumin 4.4 3.4 - 5.0 g/dL    Protein Total 8.1 6.8 - 8.8 g/dL    Alkaline Phosphatase 65 40 - 150 U/L    ALT 44 0 - 70 U/L    AST 20 0 - 45 U/L   CBC with platelets   Result Value Ref Range    WBC 6.6 4.0 - 11.0 10e9/L    RBC Count 5.21 4.4 - 5.9 10e12/L    Hemoglobin 15.2 13.3 - 17.7 g/dL    Hematocrit 43.4 40.0 - 53.0 %    MCV 83 78 - 100 fl    MCH 29.2 26.5 - 33.0 pg    MCHC 35.0 31.5 - 36.5 g/dL    RDW 12.1 10.0 - 15.0 %    Platelet Count 222 150 - 450 10e9/L   TSH with free T4 reflex   Result Value Ref Range    TSH 0.53 0.40 - 4.00 mU/L   Erythrocyte sedimentation rate auto   Result Value Ref Range    Sed Rate 6 0 - 15 mm/h   CRP inflammation   Result Value Ref Range    CRP Inflammation <2.9 0.0 - 8.0 mg/L       ASSESSMENT/PLAN:         1. HIRA (generalized anxiety disorder)  2. Attention deficit hyperactivity disorder (ADHD), unspecified ADHD type    Obed states that his PCP previously suggested trying bupropion for his anxiety, which I agree may be a good option for him given his history of ADHD.  He wishes to avoid addictive medications due to his history of opioid addiction.  He is going to be seeing a therapist at school starting next week.  Follow-up with me or PCP  in 2 months.      - buPROPion (WELLBUTRIN XL) 150 MG 24 hr tablet; Take 1 tablet (150 mg) by mouth every morning  Dispense: 90 tablet; Refill: 0      3. Fatigue, unspecified type  4. Multiple joint pain    His father requests lab work-up for fatigue and joint pain.  Joint pain is most prominent in the knees and on exam seems likely to be some patellar tendonitis, possible resulting from participation on basketball team at school.  However, he did have a tick bite last summer, so will check for Lyme.  Vit D in process. Other labs below returned as normal.  Has not had any swelling or redness, so did not check BRANDON or RA labs.     - Comprehensive metabolic panel  - CBC with platelets  - TSH with free T4 reflex  - Vitamin D Deficiency  - Lyme Disease Vannesa with reflex to WB Serum  - Erythrocyte sedimentation rate auto  - CRP inflammation      Alexi Mark MD  [facility]

## 2019-02-01 ASSESSMENT — ANXIETY QUESTIONNAIRES: GAD7 TOTAL SCORE: 4

## 2020-05-06 ENCOUNTER — VIRTUAL VISIT (OUTPATIENT)
Dept: FAMILY MEDICINE | Facility: CLINIC | Age: 25
End: 2020-05-06
Payer: COMMERCIAL

## 2020-05-06 DIAGNOSIS — J03.90 TONSILLITIS: Primary | ICD-10-CM

## 2020-05-06 DIAGNOSIS — H66.001 NON-RECURRENT ACUTE SUPPURATIVE OTITIS MEDIA OF RIGHT EAR WITHOUT SPONTANEOUS RUPTURE OF TYMPANIC MEMBRANE: ICD-10-CM

## 2020-05-06 PROCEDURE — 99213 OFFICE O/P EST LOW 20 MIN: CPT | Mod: 95 | Performed by: NURSE PRACTITIONER

## 2020-05-06 NOTE — PROGRESS NOTES
"Obed De La Cruz is a 25 year old male who is being evaluated via a billable video visit.      The patient has been notified of following:     \"This video visit will be conducted via a call between you and your physician/provider. We have found that certain health care needs can be provided without the need for an in-person physical exam.  This service lets us provide the care you need with a video conversation.  If a prescription is necessary we can send it directly to your pharmacy.  If lab work is needed we can place an order for that and you can then stop by our lab to have the test done at a later time.    Video visits are billed at different rates depending on your insurance coverage.  Please reach out to your insurance provider with any questions.    If during the course of the call the physician/provider feels a video visit is not appropriate, you will not be charged for this service.\"    Patient has given verbal consent for Video visit? Yes    How would you like to obtain your AVS? Mail a copy    Patient would like the video invitation sent by: Text to cell phone: [phone]    Will anyone else be joining your video visit? No      Subjective     Obed De La Cruz is a 25 year old male who presents to clinic today for the following health issues:    HPI  ENT Symptoms             Symptoms: cc Present Absent Comment   Fever/Chills   x    Fatigue   x    Muscle Aches   x    Eye Irritation   x    Sneezing   x    Nasal Dev/Drg   x    Sinus Pressure/Pain   x    Loss of smell   x    Dental pain   x    Sore Throat  x  Right side    Swollen Glands  x  Patients mother thinks they maybe a little swollen    Ear Pain/Fullness  x     Cough   x    Wheeze   x    Chest Pain   x    Shortness of breath   x    Rash   x    Other         Symptom duration:  Since yesterday    Symptom severity:  worsening   Treatments tried:  tylenol, ibuprofen, vitamin c   Contacts:  not that he knows of( dad was sick the beginning to April) " "          Video Start Time: 2:58 PM        Patient Active Problem List   Diagnosis     Attention deficit hyperactivity disorder (ADHD)     Anxiety     Substance abuse (H)     Heroin use disorder, severe, in controlled environment, dependence (H)     Opiate abuse, episodic (H)     Hypovitaminosis D     History reviewed. No pertinent surgical history.    Social History     Tobacco Use     Smoking status: Former Smoker     Smokeless tobacco: Former User   Substance Use Topics     Alcohol use: No     Frequency: Never     Family History   Problem Relation Age of Onset     Hypertension Father          Current Outpatient Medications   Medication Sig Dispense Refill     ascorbic acid (VITAMIN C) 500 MG tablet Take 500 mg by mouth daily       Cholecalciferol (VITAMIN D3 PO) Take 2,000 Units by mouth daily       multivitamin, therapeutic (THERA-VIT) TABS Take 1 tablet by mouth daily       buPROPion (WELLBUTRIN XL) 150 MG 24 hr tablet Take 1 tablet (150 mg) by mouth every morning (Patient not taking: Reported on 5/6/2020) 90 tablet 0     Allergies   Allergen Reactions     Sulfa Drugs Rash     Recent Labs   Lab Test 01/31/19  1034 08/02/12  2150 04/09/12  1930   ALT 44  --   --    CR 0.99 1.16* 0.90   GFRESTIMATED >90 81 >90   GFRESTBLACK >90 >90 >90   POTASSIUM 4.0 3.7 4.1   TSH 0.53  --  0.86      BP Readings from Last 3 Encounters:   01/31/19 128/74   06/07/18 136/72   10/10/17 126/70    Wt Readings from Last 3 Encounters:   01/31/19 75.3 kg (166 lb)   06/07/18 76.2 kg (168 lb)   10/10/17 74.8 kg (165 lb)                    Reviewed and updated as needed this visit by Provider         Review of Systems   ROS COMP: Constitutional, HEENT, cardiovascular, pulmonary, gi and gu systems are negative, except as otherwise noted.      Objective    There were no vitals taken for this visit.  Estimated body mass index is 23.48 kg/m  as calculated from the following:    Height as of 1/31/19: 1.791 m (5' 10.5\").    Weight as of 1/31/19: " 75.3 kg (166 lb).  Physical Exam     GENERAL: healthy, alert and no distress  EYES: Eyes grossly normal to inspection, conjunctivae and sclerae normal  RESP: no audible wheeze, cough, or visible cyanosis.  No visible retractions or increased work of breathing.  Able to speak fully in complete sentences.  NEURO: Cranial nerves grossly intact, mentation intact and speech normal  PSYCH: mentation appears normal, affect normal/bright, judgement and insight intact, normal speech and appearance well-groomed              Assessment & Plan     (J03.90) Tonsillitis  (primary encounter diagnosis)  Comment: Symptoms also representative of tonsillitis will treat with a course of Augmentin did review signs of peritonsillar abscess if he develops any low signs he should be seen  Plan: amoxicillin-clavulanate (AUGMENTIN) 875-125 MG         tablet            (H66.001) Non-recurrent acute suppurative otitis media of right ear without spontaneous rupture of tympanic membrane  Comment: Concern for right ear infection will treat with a course of Augmentin if not improving patient should be seen  Plan: amoxicillin-clavulanate (AUGMENTIN) 875-125 MG         tablet                 See Patient Instructions    Return in about 1 week (around 5/13/2020), or if symptoms worsen or fail to improve.    JLUIS Shaikh CNP  [facility]      Video-Visit Details    Type of service:  Video Visit    Video End Time:3:09 PM    Originating Location (pt. Location): Home    Distant Location (provider location):  [facility]     Platform used for Video Visit: AmWell/Doximity     Return in about 1 week (around 5/13/2020), or if symptoms worsen or fail to improve.       JLUIS Shaikh CNP

## 2020-05-06 NOTE — PATIENT INSTRUCTIONS
Patient Education     Middle Ear Infection (Adult)  You have an infection of the middle ear, the space behind the eardrum. This is also called acute otitis media (AOM). Sometimes it is caused by the common cold. This is because congestion can block the internal passage (eustachian tube) that drains fluid from the middle ear. When the middle ear fills with fluid, bacteria can grow there and cause an infection. Oral antibiotics are used to treat this illness, not ear drops. Symptoms usually start to improve within 1 to 2 days of treatment.    Home care  The following are general care guidelines:    Finish all of the antibiotic medicine given, even though you may feel better after the first few days.    You may use over-the-counter medicine, such as acetaminophen or ibuprofen, to control pain and fever, unless something else was prescribed. If you have chronic liver or kidney disease or have ever had a stomach ulcer or gastrointestinal bleeding, talk with your healthcare provider before using these medicines. Do not give aspirin to anyone under 18 years of age who has a fever. It may cause severe illness or death.  Follow-up care  Follow up with your healthcare provider, or as advised, in 2 weeks if all symptoms have not gotten better, or if hearing doesn't go back to normal within 1 month.  When to seek medical advice  Call your healthcare provider right away if any of these occur:    Ear pain gets worse or does not improve after 3 days of treatment    Unusual drowsiness or confusion    Neck pain, stiff neck, or headache    Fluid or blood draining from the ear canal    Fever of 100.4 F (38 C) or as advised     Seizure  Date Last Reviewed: 6/1/2016 2000-2019 [employer]. [address] [place] [address], [place] [address]. All rights reserved. This information is not intended as a substitute for professional medical care. Always follow your healthcare professional's instructions.           Patient Education      Pharyngitis: Strep (Presumed)    You have pharyngitis (sore throat). The healthcare staff think your sore throat is caused by streptococcus (strep) bacteria. This is often called strep throat. Strep throat can cause throat pain that is worse when swallowing, aching all over, headache, and fever. The infection is contagious. It may be spread by coughing, kissing, or touching others after touching your mouth or nose. Antibiotic medicine is given to treat the infection.  Home care    Rest at home. Drink plenty of fluids so you won t get dehydrated.    Stay home from work or school for the first 2 days of taking the antibiotics. After this time, you will not be contagious. You can then return to work or school if you are feeling better.     Take the antibiotic medicine for the full 10 days, even when you feel better. This is very important to make sure the infection is fully treated. It is also important to prevent medicine-resistant germs from growing. If you were given an antibiotic shot, no more antibiotics are needed.    You may use acetaminophen or ibuprofen to control pain or fever, unless another medicine was prescribed for this. If you have chronic liver or kidney disease or ever had a stomach ulcer or GI bleeding, talk with your healthcare provider before using these medicines.    Use throat lozenges or a throat-numbing spray to help reduce throat pain. Gargling with warm salt water can also help reduce throat pain. Dissolve 1/2 teaspoon of salt in 1 glass of warm water.     Don t eat salty or spicy foods. These can irritate the throat.  Follow-up care  Follow up with your healthcare provider or our staff if you don't get better over the next week.  When to seek medical advice  Call your healthcare provider right away if any of these occur:    Fever as directed by your healthcare provider    New or worse ear pain, sinus pain, or headache    Painful lumps in the back of neck    Stiff neck    Lymph nodes that  get larger    Can t swallow liquids, a lot of drooling, or can t open mouth wide due to throat pain    Signs of dehydration, such as very dark urine or no urine, sunken eyes, dizziness    Trouble breathing or noisy breathing    Muffled voice    New rash  Prevention  Here are steps you can take to help prevent an infection:    Keep good hand washing habits.    Don t have close contact with people who have sore throats, colds, or other upper respiratory infections.    Don t smoke, and stay away from secondhand smoke.    Stay up to date with of your vaccines.  Date Last Reviewed: 11/1/2017 2000-2019 [employer]. [address] [place] [address], [place] [address]. All rights reserved. This information is not intended as a substitute for professional medical care. Always follow your healthcare professional's instructions.

## 2021-04-16 ENCOUNTER — OFFICE VISIT (OUTPATIENT)
Dept: URGENT CARE | Facility: URGENT CARE | Age: 26
End: 2021-04-16
Payer: COMMERCIAL

## 2021-04-16 VITALS
BODY MASS INDEX: 23.57 KG/M2 | HEART RATE: 82 BPM | WEIGHT: 174 LBS | RESPIRATION RATE: 17 BRPM | DIASTOLIC BLOOD PRESSURE: 82 MMHG | TEMPERATURE: 98.4 F | OXYGEN SATURATION: 98 % | SYSTOLIC BLOOD PRESSURE: 135 MMHG | HEIGHT: 72 IN

## 2021-04-16 DIAGNOSIS — J06.9 VIRAL UPPER RESPIRATORY TRACT INFECTION: Primary | ICD-10-CM

## 2021-04-16 PROCEDURE — 99213 OFFICE O/P EST LOW 20 MIN: CPT

## 2021-04-16 PROCEDURE — U0005 INFEC AGEN DETEC AMPLI PROBE: HCPCS | Performed by: PHYSICIAN ASSISTANT

## 2021-04-16 PROCEDURE — U0003 INFECTIOUS AGENT DETECTION BY NUCLEIC ACID (DNA OR RNA); SEVERE ACUTE RESPIRATORY SYNDROME CORONAVIRUS 2 (SARS-COV-2) (CORONAVIRUS DISEASE [COVID-19]), AMPLIFIED PROBE TECHNIQUE, MAKING USE OF HIGH THROUGHPUT TECHNOLOGIES AS DESCRIBED BY CMS-2020-01-R: HCPCS | Performed by: PHYSICIAN ASSISTANT

## 2021-04-16 ASSESSMENT — ENCOUNTER SYMPTOMS
BACK PAIN: 0
VOMITING: 0
COUGH: 0
SINUS PRESSURE: 0
MYALGIAS: 1
FEVER: 0
SHORTNESS OF BREATH: 0
NAUSEA: 0
EYE ITCHING: 0
EYE PAIN: 0
ABDOMINAL PAIN: 0
EYE DISCHARGE: 0
DIARRHEA: 0
EYE REDNESS: 0
SORE THROAT: 0
PALPITATIONS: 0
SINUS PAIN: 0
RHINORRHEA: 0
ARTHRALGIAS: 0
CHILLS: 0
CONSTIPATION: 0
WHEEZING: 0

## 2021-04-16 ASSESSMENT — MIFFLIN-ST. JEOR: SCORE: 1802.26

## 2021-04-16 ASSESSMENT — PAIN SCALES - GENERAL: PAINLEVEL: EXTREME PAIN (8)

## 2021-04-16 NOTE — PROGRESS NOTES
Assessment & Plan     Viral upper respiratory tract infection  COVID pending. This is likely viral. Continue with supportive care. Get plenty of rest and push fluids. Can use Tylenol and/or ibuprofen as needed for pain and/or fever control. Discussed quarantine guidelines.        - Symptomatic COVID-19 Virus (Coronavirus) by PCR                 Return in about 1 week (around 4/23/2021), or if symptoms worsen or fail to improve.    BHAVIK SAME DAY PROVIDER  [facility]    Subjective   Chief Complaint   Patient presents with     Generalized Body Aches     flu like sx x 2-3 days. Slight headache     Ear Problem     R ear pain. nasal congestion         HPI     URI Adult    Onset of symptoms was 2 day(s) ago.  Course of illness is same.    Severity moderate  Current and Associated symptoms: right ear pain, congestion, body aches  Treatment measures tried include Tylenol/Ibuprofen and Decongestants.  Predisposing factors include None.          SUPERLIST (Optional):[id number]}      Review of Systems   Constitutional: Negative for chills and fever.   HENT: Positive for congestion. Negative for ear pain, rhinorrhea, sinus pressure, sinus pain and sore throat.    Eyes: Negative for pain, discharge, redness and itching.   Respiratory: Negative for cough, shortness of breath and wheezing.    Cardiovascular: Negative for chest pain and palpitations.   Gastrointestinal: Negative for abdominal pain, constipation, diarrhea, nausea and vomiting.   Musculoskeletal: Positive for myalgias. Negative for arthralgias and back pain.   Skin: Negative for rash.            Objective    /82 (BP Location: Right arm, Patient Position: Chair, Cuff Size: Adult Regular)   Pulse 82   Temp 98.4  F (36.9  C) (Tympanic)   Resp 17   Ht 1.829 m (6')   Wt 78.9 kg (174 lb)   SpO2 98%   BMI 23.60 kg/m    Body mass index is 23.6 kg/m .  Physical Exam  Constitutional:       General: He is not in acute distress.      Appearance: He is well-developed.   HENT:      Head: Normocephalic and atraumatic.      Right Ear: Tympanic membrane and ear canal normal.      Left Ear: Tympanic membrane and ear canal normal.   Eyes:      Conjunctiva/sclera: Conjunctivae normal.      Pupils: Pupils are equal, round, and reactive to light.   Cardiovascular:      Rate and Rhythm: Normal rate and regular rhythm.   Pulmonary:      Effort: Pulmonary effort is normal.      Breath sounds: Normal breath sounds.   Skin:     General: Skin is warm and dry.      Findings: No rash.   Psychiatric:         Behavior: Behavior normal.            COVID- pending

## 2021-04-16 NOTE — LETTER
[facility] [place]  [address] [address]  Phone: [phone]  Fax: [phone]    April 16, 2021        Obed De La Cruz  [address] [place] [id number]          To whom it may concern:    RE: Obed De La Cruz    Patient was seen and treated today at our clinic and missed school 04/16/21 through 04/23/21 and can return 04/26/21.    Please contact me for questions or concerns.      Sincerely,        BHAVIK SAME DAY PROVIDER

## 2021-04-17 LAB
SARS-COV-2 RNA RESP QL NAA+PROBE: NOT DETECTED
SPECIMEN SOURCE: NORMAL

## 2021-12-10 NOTE — LETTER
February 1, 2019      Obed De La Cruz  [address] [facility] [address]        Dear ,    We are writing to inform you of your test results.    TSH and CMP is normal.  CRP and sed rate is normal.  Lyme disease test is normal.  Your Vitamin D is low, which may be contributing to fatigue.  I would recommend taking a high dose 50,000 unit Vitamin D pill once per week for 8 weeks, and then having the level rechecked.  Prescription sent and lab order is in. Thanks.     Resulted Orders   Comprehensive metabolic panel   Result Value Ref Range    Sodium 136 133 - 144 mmol/L    Potassium 4.0 3.4 - 5.3 mmol/L    Chloride 103 94 - 109 mmol/L    Carbon Dioxide 32 20 - 32 mmol/L    Anion Gap 1 (L) 3 - 14 mmol/L    Glucose 97 70 - 99 mg/dL    Urea Nitrogen 18 7 - 30 mg/dL    Creatinine 0.99 0.66 - 1.25 mg/dL    GFR Estimate >90 >60 mL/min/[1.73_m2]      Comment:      Non  GFR Calc  Starting 12/18/2018, serum creatinine based estimated GFR (eGFR) will be   calculated using the Chronic Kidney Disease Epidemiology Collaboration   (CKD-EPI) equation.      GFR Estimate If Black >90 >60 mL/min/[1.73_m2]      Comment:       GFR Calc  Starting 12/18/2018, serum creatinine based estimated GFR (eGFR) will be   calculated using the Chronic Kidney Disease Epidemiology Collaboration   (CKD-EPI) equation.      Calcium 9.3 8.5 - 10.1 mg/dL    Bilirubin Total 0.6 0.2 - 1.3 mg/dL    Albumin 4.4 3.4 - 5.0 g/dL    Protein Total 8.1 6.8 - 8.8 g/dL    Alkaline Phosphatase 65 40 - 150 U/L    ALT 44 0 - 70 U/L    AST 20 0 - 45 U/L   CBC with platelets   Result Value Ref Range    WBC 6.6 4.0 - 11.0 10e9/L    RBC Count 5.21 4.4 - 5.9 10e12/L    Hemoglobin 15.2 13.3 - 17.7 g/dL    Hematocrit 43.4 40.0 - 53.0 %    MCV 83 78 - 100 fl    MCH 29.2 26.5 - 33.0 pg    MCHC 35.0 31.5 - 36.5 g/dL    RDW 12.1 10.0 - 15.0 %    Platelet Count 222 150 - 450 10e9/L   TSH with free T4 reflex   Result Value Ref Range     Please go to the ophthalmologist's office at 3:00pm today.    Dr. Abdulaziz Richmond  [address] [facility]  [address]  [place], [place]  [phone]     TSH 0.53 0.40 - 4.00 mU/L   Vitamin D Deficiency   Result Value Ref Range    Vitamin D Deficiency screening 19 (L) 20 - 75 ug/L      Comment:      Season, race, dietary intake, and treatment affect the concentration of   25-hydroxy-Vitamin D. Values may decrease during winter months and increase   during summer months. Values 20-29 ug/L may indicate Vitamin D insufficiency   and values <20 ug/L may indicate Vitamin D deficiency.  Vitamin D determination is routinely performed by an immunoassay specific for   25 hydroxyvitamin D3.  If an individual is on vitamin D2 (ergocalciferol)   supplementation, please specify 25 [place] vitamin D2 and D3 level determination by   LCMSMS test [id number].     Erythrocyte sedimentation rate auto   Result Value Ref Range    Sed Rate 6 0 - 15 mm/h   CRP inflammation   Result Value Ref Range    CRP Inflammation <2.9 0.0 - 8.0 mg/L       If you have any questions or concerns, please call the clinic at the number listed above.       Sincerely,        Alexi Mark MD